# Patient Record
Sex: FEMALE | Employment: FULL TIME | URBAN - METROPOLITAN AREA
[De-identification: names, ages, dates, MRNs, and addresses within clinical notes are randomized per-mention and may not be internally consistent; named-entity substitution may affect disease eponyms.]

---

## 2019-10-06 ENCOUNTER — HOSPITAL ENCOUNTER (EMERGENCY)
Age: 44
Discharge: HOME | End: 2019-10-06
Attending: EMERGENCY MEDICINE
Payer: COMMERCIAL

## 2019-10-06 ENCOUNTER — APPOINTMENT (OUTPATIENT)
Dept: GENERAL RADIOLOGY | Age: 44
End: 2019-10-06
Attending: NURSE PRACTITIONER
Payer: COMMERCIAL

## 2019-10-06 VITALS
HEART RATE: 103 BPM | HEIGHT: 67 IN | WEIGHT: 180 LBS | RESPIRATION RATE: 20 BRPM | BODY MASS INDEX: 28.25 KG/M2 | TEMPERATURE: 99.2 F | DIASTOLIC BLOOD PRESSURE: 81 MMHG | OXYGEN SATURATION: 100 % | SYSTOLIC BLOOD PRESSURE: 134 MMHG

## 2019-10-06 DIAGNOSIS — R42 VERTIGO: Primary | ICD-10-CM

## 2019-10-06 LAB
ALBUMIN SERPL-MCNC: 3.8 G/DL (ref 3.5–5)
ALBUMIN/GLOB SERPL: 0.8 {RATIO} (ref 1.1–2.2)
ALP SERPL-CCNC: 92 U/L (ref 45–117)
ALT SERPL-CCNC: 16 U/L (ref 12–78)
ANION GAP SERPL CALC-SCNC: 7 MMOL/L (ref 5–15)
AST SERPL-CCNC: 19 U/L (ref 15–37)
BASOPHILS # BLD: 0 K/UL (ref 0–0.1)
BASOPHILS NFR BLD: 0 % (ref 0–1)
BILIRUB SERPL-MCNC: 0.6 MG/DL (ref 0.2–1)
BUN SERPL-MCNC: 7 MG/DL (ref 6–20)
BUN/CREAT SERPL: 8 (ref 12–20)
CALCIUM SERPL-MCNC: 8.9 MG/DL (ref 8.5–10.1)
CHLORIDE SERPL-SCNC: 106 MMOL/L (ref 97–108)
CO2 SERPL-SCNC: 26 MMOL/L (ref 21–32)
COMMENT, HOLDF: NORMAL
CREAT SERPL-MCNC: 0.88 MG/DL (ref 0.55–1.02)
D DIMER PPP FEU-MCNC: 0.3 MG/L FEU (ref 0–0.65)
DIFFERENTIAL METHOD BLD: ABNORMAL
EOSINOPHIL # BLD: 0.1 K/UL (ref 0–0.4)
EOSINOPHIL NFR BLD: 2 % (ref 0–7)
ERYTHROCYTE [DISTWIDTH] IN BLOOD BY AUTOMATED COUNT: 15.9 % (ref 11.5–14.5)
GLOBULIN SER CALC-MCNC: 4.6 G/DL (ref 2–4)
GLUCOSE SERPL-MCNC: 94 MG/DL (ref 65–100)
HCT VFR BLD AUTO: 38.4 % (ref 35–47)
HGB BLD-MCNC: 11.8 G/DL (ref 11.5–16)
IMM GRANULOCYTES # BLD AUTO: 0 K/UL (ref 0–0.04)
IMM GRANULOCYTES NFR BLD AUTO: 0 % (ref 0–0.5)
LYMPHOCYTES # BLD: 2.4 K/UL (ref 0.8–3.5)
LYMPHOCYTES NFR BLD: 39 % (ref 12–49)
MCH RBC QN AUTO: 21.6 PG (ref 26–34)
MCHC RBC AUTO-ENTMCNC: 30.7 G/DL (ref 30–36.5)
MCV RBC AUTO: 70.2 FL (ref 80–99)
MONOCYTES # BLD: 0.4 K/UL (ref 0–1)
MONOCYTES NFR BLD: 7 % (ref 5–13)
NEUTS SEG # BLD: 3.1 K/UL (ref 1.8–8)
NEUTS SEG NFR BLD: 52 % (ref 32–75)
NRBC # BLD: 0 K/UL (ref 0–0.01)
NRBC BLD-RTO: 0 PER 100 WBC
PLATELET # BLD AUTO: 412 K/UL (ref 150–400)
PMV BLD AUTO: 8.7 FL (ref 8.9–12.9)
POTASSIUM SERPL-SCNC: 3.8 MMOL/L (ref 3.5–5.1)
PROT SERPL-MCNC: 8.4 G/DL (ref 6.4–8.2)
RBC # BLD AUTO: 5.47 M/UL (ref 3.8–5.2)
SAMPLES BEING HELD,HOLD: NORMAL
SODIUM SERPL-SCNC: 139 MMOL/L (ref 136–145)
TROPONIN I SERPL-MCNC: <0.05 NG/ML
WBC # BLD AUTO: 6 K/UL (ref 3.6–11)

## 2019-10-06 PROCEDURE — 99283 EMERGENCY DEPT VISIT LOW MDM: CPT

## 2019-10-06 PROCEDURE — 85025 COMPLETE CBC W/AUTO DIFF WBC: CPT

## 2019-10-06 PROCEDURE — 84484 ASSAY OF TROPONIN QUANT: CPT

## 2019-10-06 PROCEDURE — 93005 ELECTROCARDIOGRAM TRACING: CPT

## 2019-10-06 PROCEDURE — 36415 COLL VENOUS BLD VENIPUNCTURE: CPT

## 2019-10-06 PROCEDURE — 80053 COMPREHEN METABOLIC PANEL: CPT

## 2019-10-06 PROCEDURE — 85379 FIBRIN DEGRADATION QUANT: CPT

## 2019-10-06 PROCEDURE — 71046 X-RAY EXAM CHEST 2 VIEWS: CPT

## 2019-10-06 RX ORDER — MECLIZINE HYDROCHLORIDE 25 MG/1
25 TABLET ORAL
Qty: 30 TAB | Refills: 0 | Status: SHIPPED | OUTPATIENT
Start: 2019-10-06 | End: 2019-10-16

## 2019-10-06 NOTE — ED TRIAGE NOTES
Episodes of lightheadedness, vertigo, left sided numbness, only in arm/hand and chest tightness on/off for past month.

## 2019-10-06 NOTE — DISCHARGE INSTRUCTIONS
Thank you for allowing us to care for you today. Please follow-up with your Primary Care provider in the next 2-3 days if your symptoms do not improve. Plan for home:     Meclizine 25 mg every 8 hours as needed for dizziness. Follow-up with ENT for further work-up of your vertigo. Your cardiac workup was negative. Please followup with your primary care provider as soon as possible. Should your chest pain return, began to radiate to your jaw or left arm, developed nausea and/or vomiting please return to the ER. Do not drive yourself to the ER. Call 911. Continued episodes of chest pain or heart palpitations should be worked up by a cardiologist.    Follow-up with primary care. Patient Education        Epley Maneuver at Home for Vertigo: Exercises  Introduction  Vertigo is a spinning or whirling sensation when you move your head. Your doctor may have moved you in different positions to help your vertigo get better faster. This is called the Epley maneuver. Your doctor also may have asked you to do these exercises at home. Do the exercises as often as your doctor recommends. If your vertigo is getting worse, your doctor may have you change the exercise or stop it. Step 1  Step 1    1. Sit on the edge of a bed or sofa. Step 2    1. Turn your head 45 degrees in the direction your doctor told you to. This should be toward the ear that causes the most vertigo for you. In this picture, the woman is turning toward her left ear. Step 3    1. Tilt yourself backward until you are lying on your back. Your head should still be at a 45-degree turn. Your head should be about midway between looking straight ahead and looking out to your side. Hold for 30 seconds. If you have vertigo, stay in this position until it stops. Step 4    1. Turn your head 90 degrees toward the ear that has the least vertigo. In this picture, the woman is turning to the right because she has vertigo on her left side.  The point of your chin should be raised and over your shoulder. Hold for 30 seconds. Step 5    1. Roll onto the side with the least vertigo. You should now be looking at the floor. Hold for 30 seconds. Follow-up care is a key part of your treatment and safety. Be sure to make and go to all appointments, and call your doctor if you are having problems. It's also a good idea to know your test results and keep a list of the medicines you take. Where can you learn more? Go to http://lara-kj.info/. Enter 470 0548 in the search box to learn more about \"Epley Maneuver at Home for Vertigo: Exercises. \"  Current as of: March 28, 2019  Content Version: 12.2  © 8735-2426 [employer]. Care instructions adapted under license by [employer] (which disclaims liability or warranty for this information). If you have questions about a medical condition or this instruction, always ask your healthcare professional. [place] [address] any warranty or liability for your use of this information.

## 2019-10-06 NOTE — ED PROVIDER NOTES
Initial Complaint: chest pain, left arm tingling    Started: 1-2 months. Last Tuesday worsened    Endorses: dizziness, blurry vision, draining sensation across left nose, chest heaviness, nasuea. Episode lasted 1-2 minutes. Felt \"Off\" after the episode. Saw PCP last week for those symptoms. In September had an episode where she woke up from sleep, gasping for air. This morning, cannot get comfortable, chest heaviness, left arm feels tingling. Feels off balance. H/O vertigo. First episode of vertigo lasted 5 days. Now only lasts seconds. Denies: F/C, recent nausea, vomiting, neck pain, recent illnesses, no medication changes. On Zyrtec year round and prn Claritin with the zyrtec per PCP instructions. Made better: nothing  Made worse: nothing    No further complaints. No past medical history on file. No past surgical history on file. Reviewed      Primary care provider: Leda Sanderson MD      The history is provided by the patient. No  was used. No past medical history on file. No past surgical history on file. No family history on file.     Social History     Socioeconomic History    Marital status:      Spouse name: Not on file    Number of children: Not on file    Years of education: Not on file    Highest education level: Not on file   Occupational History    Not on file   Social Needs    Financial resource strain: Not on file    Food insecurity:     Worry: Not on file     Inability: Not on file    Transportation needs:     Medical: Not on file     Non-medical: Not on file   Tobacco Use    Smoking status: Not on file   Substance and Sexual Activity    Alcohol use: Not on file    Drug use: Not on file    Sexual activity: Not on file   Lifestyle    Physical activity:     Days per week: Not on file     Minutes per session: Not on file    Stress: Not on file   Relationships    Social connections:     Talks on phone: Not on file     Gets together: Not on file     Attends Restorationism service: Not on file     Active member of club or organization: Not on file     Attends meetings of clubs or organizations: Not on file     Relationship status: Not on file    Intimate partner violence:     Fear of current or ex partner: Not on file     Emotionally abused: Not on file     Physically abused: Not on file     Forced sexual activity: Not on file   Other Topics Concern    Not on file   Social History Narrative    Not on file     ALLERGIES: Patient has no allergy information on record. Review of Systems   Constitutional: Negative for chills and fever. HENT: Negative for sneezing, sore throat and voice change. Respiratory: Positive for chest tightness. Negative for shortness of breath. Cardiovascular: Positive for chest pain. Gastrointestinal: Negative for nausea and vomiting. Psychiatric/Behavioral: Negative. All other systems reviewed and are negative. Vitals:    10/06/19 0933   BP: 134/81   Pulse: (!) 103   Resp: 20   Temp: 99.2 °F (37.3 °C)   SpO2: 100%   Weight: 81.6 kg (180 lb)   Height: 5' 7\" (1.702 m)          Physical Exam   Constitutional: She is oriented to person, place, and time. She appears well-developed and well-nourished. HENT:   Head: Normocephalic and atraumatic. Mouth/Throat: Mucous membranes are dry. No uvula swelling. No oropharyngeal exudate, posterior oropharyngeal edema, posterior oropharyngeal erythema or tonsillar abscesses. No tonsillar exudate. Neck: Normal range of motion. Neck supple. Cardiovascular: Normal rate, regular rhythm, normal heart sounds and intact distal pulses. Pulmonary/Chest: Effort normal and breath sounds normal. No respiratory distress. She has no wheezes. She has no rales. She exhibits no tenderness. Abdominal: Soft. Bowel sounds are normal. There is no tenderness. There is no guarding. Musculoskeletal: Normal range of motion.    Neurological: She is alert and oriented to person, place, and time. Skin: Skin is warm and dry. No erythema. Psychiatric: She has a normal mood and affect. Her behavior is normal. Judgment and thought content normal.   Nursing note and vitals reviewed. MDM       Procedures    Assessment & Plan:     Orders Placed This Encounter    XR CHEST PA AND LATERAL    CBC WITH AUTOMATED DIFF    METABOLIC PANEL, COMPREHENSIVE    TROPONIN I    EKG 12 LEAD INITIAL       Discussed with Gricel Camp MD,ED Provider    Oren Nieto NP  10/06/19  9:40 AM      Labs and imaging without acute findings. Follow-up with PCP. Discussed return precautions. 11:45 AM  Patient re-evaluated. All questions answered. Patient appropriate for discharge. Given return precautions and follow up instructions. LABORATORY TESTS:  Labs Reviewed   CBC WITH AUTOMATED DIFF - Abnormal; Notable for the following components:       Result Value    RBC 5.47 (*)     MCV 70.2 (*)     MCH 21.6 (*)     RDW 15.9 (*)     PLATELET 810 (*)     MPV 8.7 (*)     All other components within normal limits   METABOLIC PANEL, COMPREHENSIVE - Abnormal; Notable for the following components:    BUN/Creatinine ratio 8 (*)     Protein, total 8.4 (*)     Globulin 4.6 (*)     A-G Ratio 0.8 (*)     All other components within normal limits   TROPONIN I   SAMPLES BEING HELD   D DIMER       IMAGING RESULTS:  XR CHEST PA LAT   Final Result   Impression: Normal exam.             MEDICATIONS GIVEN:  Medications - No data to display    IMPRESSION:  1. Vertigo        PLAN:  1. Current Discharge Medication List      START taking these medications    Details   meclizine (ANTIVERT) 25 mg tablet Take 1 Tab by mouth three (3) times daily as needed for Dizziness for up to 10 days. Qty: 30 Tab, Refills: 0           2.    Follow-up Information     Follow up With Specialties Details Why Contact Info    Cesar Musa MD [facility] Schedule an appointment as soon as possible for a visit  [address] [place] Tom Esparza MD Otolaryngology Schedule an appointment as soon as possible for a visit As needed [address] [facility]  [address] [facility] [address]  [phone] [address] [facility] DEPT Emergency Medicine  As needed, If symptoms worsen [address] [facility]  [phone]        3. Return to ED for new or worsening symptoms       Andrea Olivera NP      Please note that this dictation was completed with [employer], the computer voice recognition software. Quite often unanticipated grammatical, syntax, homophones, and other interpretive errors are inadvertently transcribed by the computer software. Please disregard these errors. Please excuse any errors that have escaped final proofreading.     ED EKG interpretation: 2732  Rhythm: normal sinus rhythm;Rate (approx.): 92 bpm; Minimal voltage criteria for LVH (may be normal variant)    Note written by Buddie Soulier, Scribe, as dictated by Aidee Rodriguez MD 09:35 AM

## 2019-10-06 NOTE — ED NOTES
Verbal shift change report given to Adrian (oncoming nurse) by Osvaldo Gardner (offgoing nurse). Report included the following information SBAR, ED Summary and Recent Results.

## 2019-10-07 LAB
ATRIAL RATE: 92 BPM
CALCULATED P AXIS, ECG09: 64 DEGREES
CALCULATED R AXIS, ECG10: 51 DEGREES
CALCULATED T AXIS, ECG11: 36 DEGREES
DIAGNOSIS, 93000: NORMAL
P-R INTERVAL, ECG05: 134 MS
Q-T INTERVAL, ECG07: 368 MS
QRS DURATION, ECG06: 84 MS
QTC CALCULATION (BEZET), ECG08: 455 MS
VENTRICULAR RATE, ECG03: 92 BPM

## 2019-12-03 ENCOUNTER — OFFICE VISIT (OUTPATIENT)
Dept: NEUROLOGY | Age: 44
End: 2019-12-03

## 2019-12-03 VITALS
OXYGEN SATURATION: 98 % | SYSTOLIC BLOOD PRESSURE: 138 MMHG | RESPIRATION RATE: 16 BRPM | HEIGHT: 67 IN | HEART RATE: 75 BPM | WEIGHT: 199 LBS | DIASTOLIC BLOOD PRESSURE: 90 MMHG | BODY MASS INDEX: 31.23 KG/M2

## 2019-12-03 DIAGNOSIS — G43.809 MIGRAINE VARIANT: ICD-10-CM

## 2019-12-03 DIAGNOSIS — H81.4 VERTIGO OF CENTRAL ORIGIN: Primary | ICD-10-CM

## 2019-12-03 DIAGNOSIS — H55.09 UPBEAT NYSTAGMUS: ICD-10-CM

## 2019-12-03 RX ORDER — BISMUTH SUBSALICYLATE 262 MG
1 TABLET,CHEWABLE ORAL DAILY
COMMUNITY

## 2019-12-03 NOTE — PROGRESS NOTES
Chief Complaint   Patient presents with    Neurologic Problem    Dizziness       Referred by: Dr. Evelia HELTON    41-year-old woman with a history of menstrual migraine here for vertigo. She tells me for the past 5 years she has had sudden episodes of prolonged vertigo lasting about 20 minutes. Over time she has noticed there has been a change where she now has nausea and she feels like she has some sensory disturbances in the upper left lip. Events are non-positional.  No associated headache. No double vision or weakness. Concomitantly she has menstrual migraine lasting about 1 day bitemporal throbbing pain with light sensitivity. ENT work-up unrevealing. Review of Systems   Eyes: Negative for double vision. Gastrointestinal: Positive for nausea. Neurological: Positive for dizziness and headaches. All other systems reviewed and are negative. Past Medical History:   Diagnosis Date    Headache      Family History   Problem Relation Age of Onset    Cancer Maternal Grandmother      Social History     Socioeconomic History    Marital status:      Spouse name: Not on file    Number of children: Not on file    Years of education: Not on file    Highest education level: Not on file   Occupational History    Not on file   Social Needs    Financial resource strain: Not on file    Food insecurity:     Worry: Not on file     Inability: Not on file    Transportation needs:     Medical: Not on file     Non-medical: Not on file   Tobacco Use    Smoking status: Never Smoker    Smokeless tobacco: Never Used   Substance and Sexual Activity    Alcohol use: Yes     Alcohol/week: 1.0 standard drinks     Types: 1 Glasses of wine per week     Frequency: Monthly or less     Drinks per session: 1 or 2     Binge frequency: Never     Comment: occas.     Drug use: Never    Sexual activity: Yes     Partners: Male   Lifestyle    Physical activity:     Days per week: Not on file     Minutes per session: Not on file    Stress: Not on file   Relationships    Social connections:     Talks on phone: Not on file     Gets together: Not on file     Attends Yazidi service: Not on file     Active member of club or organization: Not on file     Attends meetings of clubs or organizations: Not on file     Relationship status: Not on file    Intimate partner violence:     Fear of current or ex partner: Not on file     Emotionally abused: Not on file     Physically abused: Not on file     Forced sexual activity: Not on file   Other Topics Concern    Not on file   Social History Narrative    Not on file     Current Outpatient Medications   Medication Sig    multivitamin (ONE A DAY) tablet Take 1 Tab by mouth daily. No current facility-administered medications for this visit. Allergies   Allergen Reactions    Erythromycin Other (comments)     \" Unsure\"    Prednisone Rash         Neurologic Exam     Mental Status   Oriented to person, place, and time. Cranial Nerves   Cranial nerves II through XII intact. Upbeat nystagmus     Motor Exam   Muscle bulk: normal    Strength   Strength 5/5 throughout. Sensory Exam   Light touch normal.     Gait, Coordination, and Reflexes     Gait  Gait: normal    Coordination   Romberg: negative  Finger to nose coordination: normal    Tremor   Resting tremor: absent    Reflexes   Right brachioradialis: 2+  Left brachioradialis: 2+  Right biceps: 2+  Left biceps: 2+  Right triceps: 2+  Left triceps: 2+  Right patellar: 2+  Left patellar: 2+  Right achilles: 2+  Left achilles: 2+    Physical Exam   Constitutional: She is oriented to person, place, and time. She appears well-developed and well-nourished. Cardiovascular: Normal rate. Pulmonary/Chest: Effort normal.   Neurological: She is oriented to person, place, and time. She has normal strength.  She has a normal Finger-Nose-Finger Test and a normal Romberg Test. Gait normal.   Reflex Scores:       Tricep reflexes are 2+ on the right side and 2+ on the left side. Bicep reflexes are 2+ on the right side and 2+ on the left side. Brachioradialis reflexes are 2+ on the right side and 2+ on the left side. Patellar reflexes are 2+ on the right side and 2+ on the left side. Achilles reflexes are 2+ on the right side and 2+ on the left side. Skin: Skin is warm and dry. Psychiatric: Her behavior is normal.   Vitals reviewed. Visit Vitals  /90   Pulse 75   Resp 16   Ht 5' 7\" (1.702 m)   Wt 90.3 kg (199 lb)   LMP 11/27/2019 (Approximate)   SpO2 98%   BMI 31.17 kg/m²       Lab Results   Component Value Date/Time    WBC 6.0 10/06/2019 09:49 AM    HGB 11.8 10/06/2019 09:49 AM    HCT 38.4 10/06/2019 09:49 AM    PLATELET 241 (H) 49/33/6169 09:49 AM    MCV 70.2 (L) 10/06/2019 09:49 AM     Lab Results   Component Value Date/Time    Glucose 94 10/06/2019 09:49 AM    Creatinine 0.88 10/06/2019 09:49 AM      No results found for: CHOL, CHOLPOCT, HDL, LDL, LDLC, LDLCPOC, LDLCEXT, TRIGL, TGLPOCT, CHHD, CHHDX  Lab Results   Component Value Date/Time    ALT (SGPT) 16 10/06/2019 09:49 AM    AST (SGOT) 19 10/06/2019 09:49 AM    Alk. phosphatase 92 10/06/2019 09:49 AM    Bilirubin, total 0.6 10/06/2019 09:49 AM    Albumin 3.8 10/06/2019 09:49 AM    Protein, total 8.4 (H) 10/06/2019 09:49 AM    PLATELET 979 (H) 67/07/2201 09:49 AM        Assessment and Plan   Diagnoses and all orders for this visit:    1. Vertigo of central origin  -     MRI BRAIN WO CONT; Future    2. Upbeat nystagmus  -     MRI BRAIN WO CONT; Future    3. Migraine variant      48-year-old woman with menstrual migraine who is having vertigo intermittently non-positional suggestive of a central etiology. Exam with a little bit of upgaze nystagmus and she has subjective left upper lip numbness. Recommend MRI brain to rule out multiple sclerosis or other central issue. Start magnesium supplements as a mild preventative.   Her events are every other week at this point. Keep a headache log. Call with any questions and we will see her at her next visit. I reviewed and decided to continue the current medications. This clinical note was dictated with an electronic dictation software that can make unintentional errors. If there are any questions, please contact me directly for clarification. A notice of this visit/encounter being completed has been sent electronically to the patient's PCP and/or referring provider.      [address] [place] [address], Formerly named [facility] [address]  Diplomate SUSIEN

## 2019-12-03 NOTE — PATIENT INSTRUCTIONS
MRI of the Head: About This Test 
What is it? MRI (magnetic resonance imaging) is a test that uses a magnetic field and pulses of radio wave energy to make pictures of the organs and structures inside the body. An MRI of the head can give your doctor information about your brain, eyes, ears, and nerves. When you have an MRI, you lie on a table and the table moves into the MRI machine. Why is this test done? An MRI of the head can help find problems such as tumors and infection. It also can check symptoms of a head injury or of diseases such as multiple sclerosis (MS) and stroke. How can you prepare for the test? 
Talk to your doctor about all your health conditions before the test. For example, tell your doctor if: 
· You are allergic to any medicines. · You are or might be pregnant. · You have a pacemaker, an artificial limb, any metal pins or metal parts in your body, metal heart valves, metal clips in your brain, metal implants in your ears, or any other implanted or prosthetic medical device. · You have an intrauterine device (IUD) in place. · You get nervous in confined spaces. You may need medicine to help you relax. · You wear a patch that contains medicine. · You have kidney disease. What happens before the test? 
· You will remove all metal objects, such as hearing aids, dentures, jewelry, watches, and hairpins. · You may need to take off some of your clothes. You will be given a gown to wear during the test. If you do leave some clothes on, make sure you take everything out of your pockets. · You may have contrast material (dye) put into your arm through a tube called an IV. Contrast material helps doctors see specific organs, blood vessels, and most tumors. What happens during the test? 
· You will lie on your back on a table that is part of the MRI scanner. Your head, chest, and arms may be held with straps to help you lie still. · The table will slide into the space that contains the magnet. A device called a coil may be placed over or wrapped around your head. · Inside the scanner you will hear a fan and feel air moving. You may hear tapping, thumping, or snapping noises. You may be given earplugs or headphones to reduce the noise. · You will be asked to hold still during the scan. You may be asked to hold your breath for short periods. · You may be alone in the scanning room, but a technologist will be watching you through a window and talking with you during the test. 
What else should you know about the test? 
· An MRI does not hurt. · You may feel warmth in the area being examined. This is normal. 
· A dye (contrast material) that contains gadolinium may be used in this test. Be sure to tell your doctor if: 
? You are pregnant or think you may be pregnant. ? You have kidney problems. ? You've had more than one test that used gadolinium. · The U.S. Food and Drug Administration (FDA) has safety warnings about gadolinium. But for most people, the benefit of its use in this test outweighs the risk. · If a dye is used, you may feel a quick sting or pinch and some coolness when the IV is started. The dye may give you a metallic taste in your mouth. Some people feel sick to their stomach or get a headache. · If you breastfeed and are concerned about whether the dye used in this test is safe, talk to your doctor. Most experts believe that very little dye passes into breast milk and even less is passed on to the baby. But if you prefer, you can store some of your breast milk ahead of time and use it for a day or two after the test. 
How long does the test take? · The test usually takes 30 to 60 minutes but can take as long as 2 hours. What happens after the test? 
· You will probably be able to go home right away, depending on the reason for the test. 
· You can go back to your usual activities right away. Follow-up care is a key part of your treatment and safety. Be sure to make and go to all appointments, and call your doctor if you are having problems. It's also a good idea to keep a list of the medicines you take. Ask your doctor when you can expect to have your test results. Where can you learn more? Go to http://lara-jk.info/. Enter J489 in the search box to learn more about \"MRI of the Head: About This Test.\" Current as of: March 28, 2019 Content Version: 12.2 © 8468-6646 [employer]. Care instructions adapted under license by [employer] (which disclaims liability or warranty for this information). If you have questions about a medical condition or this instruction, always ask your healthcare professional. [address] any warranty or liability for your use of this information.

## 2019-12-03 NOTE — PROGRESS NOTES
Pt referred to us due to vertigo. Started about 5 years ago. Lasted  for a week straight. Has at least 1 every other week and will last 20min or so. Feels \"off\" afterwards. She also has 1 migraine a month around her cycle. She has sensations that come and go.

## 2020-07-20 ENCOUNTER — TELEPHONE (OUTPATIENT)
Dept: NEUROLOGY | Age: 45
End: 2020-07-20

## 2020-07-20 DIAGNOSIS — H81.4 VERTIGO OF CENTRAL ORIGIN: Primary | ICD-10-CM

## 2020-07-20 NOTE — TELEPHONE ENCOUNTER
----- Message from Cheryl Malagon sent at 7/20/2020 12:53 PM EDT -----  Regarding: Dr. Merari Avila Message/Vendor Calls    Caller's first and last name: Pt      Reason for call: Requesting MRI to be scheduled.        Callback required yes/no and why: Yes      Best contact number(s): [phone]      Details to clarify the request:      Cheryl Malagon

## 2020-08-07 ENCOUNTER — HOSPITAL ENCOUNTER (OUTPATIENT)
Dept: MRI IMAGING | Age: 45
Discharge: HOME | End: 2020-08-07
Payer: COMMERCIAL

## 2020-08-07 DIAGNOSIS — H81.4 VERTIGO OF CENTRAL ORIGIN: ICD-10-CM

## 2020-08-07 PROCEDURE — 70551 MRI BRAIN STEM W/O DYE: CPT | Performed by: PSYCHIATRY & NEUROLOGY

## 2020-08-11 NOTE — PROGRESS NOTES
Her MRI scan looks good. Its normal.  No tumors. No strokes.   If she still feeling symptomatic she should follow-up in the office with myself or the NP.